# Patient Record
Sex: FEMALE | Race: WHITE | NOT HISPANIC OR LATINO | Employment: UNEMPLOYED | URBAN - METROPOLITAN AREA
[De-identification: names, ages, dates, MRNs, and addresses within clinical notes are randomized per-mention and may not be internally consistent; named-entity substitution may affect disease eponyms.]

---

## 2024-10-09 ENCOUNTER — HOSPITAL ENCOUNTER (OUTPATIENT)
Facility: HOSPITAL | Age: 34
Discharge: HOME | End: 2024-10-09
Attending: EMERGENCY MEDICINE | Admitting: EMERGENCY MEDICINE
Payer: MEDICAID

## 2024-10-09 VITALS
HEIGHT: 64 IN | RESPIRATION RATE: 14 BRPM | SYSTOLIC BLOOD PRESSURE: 127 MMHG | HEART RATE: 69 BPM | TEMPERATURE: 97.9 F | WEIGHT: 196.1 LBS | OXYGEN SATURATION: 100 % | DIASTOLIC BLOOD PRESSURE: 61 MMHG | BODY MASS INDEX: 33.48 KG/M2

## 2024-10-09 DIAGNOSIS — J22 LOWER RESPIRATORY TRACT INFECTION: Primary | ICD-10-CM

## 2024-10-09 LAB
FLUAV SUBTYP SPEC NAA+PROBE: NOT DETECTED
FLUBV RNA ISLT QL NAA+PROBE: NOT DETECTED
SARS-COV-2 RNA RESP QL NAA+PROBE: NOT DETECTED

## 2024-10-09 PROCEDURE — 87636 SARSCOV2 & INF A&B AMP PRB: CPT | Performed by: EMERGENCY MEDICINE

## 2024-10-09 PROCEDURE — G0463 HOSPITAL OUTPT CLINIC VISIT: HCPCS

## 2024-10-09 RX ORDER — PROPRANOLOL HCL 20 MG
1 TABLET ORAL EVERY 12 HOURS SCHEDULED
COMMUNITY
Start: 2024-10-08

## 2024-10-09 RX ORDER — BUSPIRONE HYDROCHLORIDE 15 MG/1
1 TABLET ORAL 3 TIMES DAILY
COMMUNITY
Start: 2024-10-08

## 2024-10-09 RX ORDER — BUPRENORPHINE HYDROCHLORIDE AND NALOXONE HYDROCHLORIDE DIHYDRATE 8; 2 MG/1; MG/1
TABLET SUBLINGUAL
COMMUNITY
Start: 2024-10-08

## 2024-10-09 RX ORDER — ONDANSETRON 4 MG/1
TABLET, ORALLY DISINTEGRATING ORAL
COMMUNITY
Start: 2024-10-08

## 2024-10-09 RX ORDER — METHYLPREDNISOLONE 4 MG
TABLET, DOSE PACK ORAL
Qty: 21 TABLET | Refills: 0 | Status: SHIPPED | OUTPATIENT
Start: 2024-10-09

## 2024-10-09 RX ORDER — BENZONATATE 200 MG/1
200 CAPSULE ORAL 3 TIMES DAILY PRN
Qty: 21 CAPSULE | Refills: 0 | Status: SHIPPED | OUTPATIENT
Start: 2024-10-09 | End: 2024-10-16

## 2024-10-09 RX ORDER — AZITHROMYCIN 250 MG/1
TABLET, FILM COATED ORAL
Qty: 6 TABLET | Refills: 0 | Status: SHIPPED | OUTPATIENT
Start: 2024-10-09

## 2024-10-09 RX ORDER — ATOMOXETINE 40 MG/1
40 CAPSULE ORAL EVERY MORNING
COMMUNITY
Start: 2024-10-08

## 2024-10-09 RX ORDER — LAMOTRIGINE 25 MG/1
1 TABLET ORAL EVERY 12 HOURS SCHEDULED
COMMUNITY
Start: 2024-10-08

## 2024-10-09 RX ORDER — ALBUTEROL SULFATE 90 UG/1
2 INHALANT RESPIRATORY (INHALATION) EVERY 4 HOURS PRN
Qty: 6.7 G | Refills: 0 | Status: SHIPPED | OUTPATIENT
Start: 2024-10-09 | End: 2024-10-16

## 2024-10-09 RX ORDER — QUETIAPINE FUMARATE 200 MG/1
200 TABLET, FILM COATED ORAL
COMMUNITY
Start: 2024-10-08

## 2024-10-09 RX ORDER — DOCUSATE SODIUM 100 MG/1
1 CAPSULE, LIQUID FILLED ORAL EVERY 12 HOURS SCHEDULED
COMMUNITY
Start: 2024-10-08

## 2024-10-09 NOTE — Clinical Note
[facility] FSED [place] [id number] [place] [address] [place] [address]  Phone: [phone]    Va Díaz was seen and treated in our emergency department on 10/9/2024.  She may return to school on 10/11/2024.          Thank you for choosing [facility].    Emery Alvarez Jr., APRN

## 2024-10-09 NOTE — Clinical Note
[facility] FS[facility] [place] [id number] [place] [address] [place] [address]  Phone: [phone]    Va Díaz was seen and treated in our emergency department on 10/9/2024.  She may return to school on 10/11/2024.          Thank you for choosing [facility].    Emery Alvarez Jr., APRN

## 2024-10-10 NOTE — FSED PROVIDER NOTE
Subjective   History of Present Illness  34-year-old female reports a 4-day history of cough.  Denies chest pain reports occasional sensation of not being able to get a full breath when coughing.  Patient reports to vape and is trying to cut back.  She also reports she lives in a group home and has required to go to classes and has been very fatigued and tired.  She is asking for a school note to excuse her classes        Review of Systems   All other systems reviewed and are negative.      Past Medical History:   Diagnosis Date    Anxiety and depression        Allergies   Allergen Reactions    Amoxicillin Hives    Penicillins Hives    Wellbutrin [Bupropion] Mental Status Change     Pt. Reports made go crazy. Made her want to hurt herself.       Past Surgical History:   Procedure Laterality Date     SECTION      DILATATION AND CURETTAGE         History reviewed. No pertinent family history.    Social History     Socioeconomic History    Marital status: Legally    Tobacco Use    Smoking status: Every Day     Current packs/day: 0.50     Types: Cigarettes   Vaping Use    Vaping status: Every Day    Substances: Nicotine    Passive vaping exposure: Yes   Substance and Sexual Activity    Drug use: Not Currently           Objective   Physical Exam  Vitals and nursing note reviewed.   Constitutional:       General: She is not in acute distress.     Appearance: Normal appearance.   HENT:      Head: Normocephalic and atraumatic.      Right Ear: Ear canal and external ear normal.      Ears:      Comments: Right TM has fluid behind     Mouth/Throat:      Mouth: Mucous membranes are moist.      Pharynx: Oropharynx is clear. No oropharyngeal exudate.   Eyes:      Extraocular Movements: Extraocular movements intact.      Conjunctiva/sclera: Conjunctivae normal.      Pupils: Pupils are equal, round, and reactive to light.   Cardiovascular:      Rate and Rhythm: Normal rate.      Pulses: Normal pulses.   Pulmonary:       Effort: Pulmonary effort is normal. No respiratory distress.      Breath sounds: Wheezing and rhonchi present.   Abdominal:      General: Abdomen is flat. Bowel sounds are normal.      Palpations: Abdomen is soft.   Musculoskeletal:         General: Normal range of motion.   Skin:     General: Skin is warm.   Neurological:      General: No focal deficit present.      Mental Status: She is alert and oriented to person, place, and time. Mental status is at baseline.         Procedures           ED Course  ED Course as of 10/09/24 2100   Wed Oct 09, 2024   2018 COVID influenza negative [WF]      ED Course User Index  [WF] Emery Alvarez Jr., APRN                                           Medical Decision Making  Influenza are negative patient has rhonchi and wheezing that  does improve with deep coughing.  Plan to discharge home on a Z-Ulises a Medrol dose taper with an albuterol inhaler and Tessalon Perles    Problems Addressed:  Lower respiratory tract infection: complicated acute illness or injury    Risk  Prescription drug management.        Final diagnoses:   Lower respiratory tract infection       ED Disposition  ED Disposition       ED Disposition   Discharge    Condition   Stable    Comment   --               No follow-up provider specified.       Medication List        New Prescriptions      albuterol sulfate  (90 Base) MCG/ACT inhaler  Commonly known as: PROVENTIL HFA;VENTOLIN HFA;PROAIR HFA  Inhale 2 puffs Every 4 (Four) Hours As Needed for Wheezing for up to 7 days.     azithromycin 250 MG tablet  Commonly known as: Zithromax Z-Ulises  Take 2 tablets by mouth on day 1, then 1 tablet daily on days 2-5     benzonatate 200 MG capsule  Commonly known as: TESSALON  Take 1 capsule by mouth 3 (Three) Times a Day As Needed for Cough for up to 7 days.     methylPREDNISolone 4 MG dose pack  Commonly known as: MEDROL  Take as directed on package instructions.               Where to Get Your Medications         These medications were sent to [facility]/pharmacy #[id number] - [place], [place] - [address] [place] [address] - [phone]  - [phone] [address] [place] [address]      Hours: 24-hours Phone: [phone]   albuterol sulfate  (90 Base) MCG/ACT inhaler  azithromycin 250 MG tablet  benzonatate 200 MG capsule  methylPREDNISolone 4 MG dose pack

## 2024-10-17 ENCOUNTER — HOSPITAL ENCOUNTER (OUTPATIENT)
Facility: HOSPITAL | Age: 34
Discharge: HOME | End: 2024-10-17
Attending: EMERGENCY MEDICINE | Admitting: EMERGENCY MEDICINE
Payer: MEDICAID

## 2024-10-17 VITALS
SYSTOLIC BLOOD PRESSURE: 148 MMHG | HEART RATE: 74 BPM | WEIGHT: 200 LBS | HEIGHT: 64 IN | OXYGEN SATURATION: 99 % | TEMPERATURE: 98 F | BODY MASS INDEX: 34.15 KG/M2 | DIASTOLIC BLOOD PRESSURE: 74 MMHG | RESPIRATION RATE: 16 BRPM

## 2024-10-17 DIAGNOSIS — Z13.9 ENCOUNTER FOR MEDICAL SCREENING EXAMINATION: Primary | ICD-10-CM

## 2024-10-17 LAB
B-HCG UR QL: NEGATIVE
BILIRUB UR QL STRIP: NEGATIVE
CLARITY UR: CLEAR
COLOR UR: YELLOW
GLUCOSE UR STRIP-MCNC: NEGATIVE MG/DL
HGB UR QL STRIP.AUTO: NEGATIVE
KETONES UR QL STRIP: NEGATIVE
LEUKOCYTE ESTERASE UR QL STRIP.AUTO: NEGATIVE
NITRITE UR QL STRIP: NEGATIVE
PH UR STRIP.AUTO: 7 [PH] (ref 5–8)
PROT UR QL STRIP: NEGATIVE
SP GR UR STRIP: 1.01 (ref 1–1.03)
UROBILINOGEN UR QL STRIP: NORMAL

## 2024-10-17 PROCEDURE — G0463 HOSPITAL OUTPT CLINIC VISIT: HCPCS | Performed by: EMERGENCY MEDICINE

## 2024-10-17 PROCEDURE — 81003 URINALYSIS AUTO W/O SCOPE: CPT

## 2024-10-17 PROCEDURE — 81025 URINE PREGNANCY TEST: CPT | Performed by: EMERGENCY MEDICINE

## 2024-10-17 PROCEDURE — 99212 OFFICE O/P EST SF 10 MIN: CPT | Performed by: EMERGENCY MEDICINE

## 2024-10-18 NOTE — FSED PROVIDER NOTE
HPI: 34-year-old female with multiple psychiatric diagnoses arrives complaining of missing her period and concern for pregnancy.  She would like a pregnancy test.  This is performed in triage according to triage protocols and returns negative.    PMFSH: see problem list... Depression, anxiety, opioid dependence, medications include Strattera, Suboxone, Inderal, Seroquel, BuSpar, and Lamictal    ROS: As above.  All other systems are reviewed and negative.    PHYSICAL EXAM: This is an obese female in no distress sitting on a chair with BMI 34    Head normocephalic atraumatic    Eupneic    Heart regular rate and rhythm    Skin warm and dry    Abdomen nondistended    MDM: Patient has normal hemodynamics and appears in no distress.  She may have pregnancy or metrorrhagia.  Pregnancy test is negative    ED COURSE: Patient is counseled accordingly    DIAGNOSIS: Medical screening exam, missed menses    DISPOSITION: Patient is discharged from the ED in good condition.